# Patient Record
Sex: FEMALE | Race: WHITE | Employment: UNEMPLOYED | URBAN - METROPOLITAN AREA
[De-identification: names, ages, dates, MRNs, and addresses within clinical notes are randomized per-mention and may not be internally consistent; named-entity substitution may affect disease eponyms.]

---

## 2021-02-04 ENCOUNTER — HOSPITAL ENCOUNTER (EMERGENCY)
Age: 56
Discharge: HOME | End: 2021-02-06
Attending: EMERGENCY MEDICINE

## 2021-02-04 DIAGNOSIS — F10.920 ALCOHOLIC INTOXICATION WITHOUT COMPLICATION (HCC): Primary | ICD-10-CM

## 2021-02-04 DIAGNOSIS — F32.A DEPRESSION WITH SUICIDAL IDEATION: ICD-10-CM

## 2021-02-04 DIAGNOSIS — R45.851 DEPRESSION WITH SUICIDAL IDEATION: ICD-10-CM

## 2021-02-04 LAB
ANION GAP SERPL CALC-SCNC: 9 MMOL/L (ref 7–16)
APAP SERPL-MCNC: <10 UG/ML (ref 10–30)
BACTERIA URNS QL MICRO: 0 /HPF
BASOPHILS # BLD: 0 K/UL (ref 0–0.2)
BASOPHILS NFR BLD: 0 % (ref 0–2)
BUN SERPL-MCNC: 7 MG/DL (ref 6–23)
CALCIUM SERPL-MCNC: 9.2 MG/DL (ref 8.3–10.4)
CASTS URNS QL MICRO: 0 /LPF
CHLORIDE SERPL-SCNC: 108 MMOL/L (ref 98–107)
CO2 SERPL-SCNC: 26 MMOL/L (ref 21–32)
CREAT SERPL-MCNC: 0.48 MG/DL (ref 0.6–1)
DIFFERENTIAL METHOD BLD: ABNORMAL
EOSINOPHIL # BLD: 0.1 K/UL (ref 0–0.8)
EOSINOPHIL NFR BLD: 1 % (ref 0.5–7.8)
EPI CELLS #/AREA URNS HPF: NORMAL /HPF
ERYTHROCYTE [DISTWIDTH] IN BLOOD BY AUTOMATED COUNT: 13.2 % (ref 11.9–14.6)
ETHANOL SERPL-MCNC: 301 MG/DL
GLUCOSE SERPL-MCNC: 94 MG/DL (ref 65–100)
HCT VFR BLD AUTO: 49.3 % (ref 35.8–46.3)
HGB BLD-MCNC: 16.9 G/DL (ref 11.7–15.4)
IMM GRANULOCYTES # BLD AUTO: 0 K/UL (ref 0–0.5)
IMM GRANULOCYTES NFR BLD AUTO: 0 % (ref 0–5)
LYMPHOCYTES # BLD: 5.4 K/UL (ref 0.5–4.6)
LYMPHOCYTES NFR BLD: 59 % (ref 13–44)
MCH RBC QN AUTO: 31.8 PG (ref 26.1–32.9)
MCHC RBC AUTO-ENTMCNC: 34.3 G/DL (ref 31.4–35)
MCV RBC AUTO: 92.8 FL (ref 79.6–97.8)
MONOCYTES # BLD: 0.3 K/UL (ref 0.1–1.3)
MONOCYTES NFR BLD: 3 % (ref 4–12)
NEUTS SEG # BLD: 3.3 K/UL (ref 1.7–8.2)
NEUTS SEG NFR BLD: 36 % (ref 43–78)
NRBC # BLD: 0 K/UL (ref 0–0.2)
PLATELET # BLD AUTO: 191 K/UL (ref 150–450)
PMV BLD AUTO: 10.7 FL (ref 9.4–12.3)
POTASSIUM SERPL-SCNC: 3.8 MMOL/L (ref 3.5–5.1)
RBC # BLD AUTO: 5.31 M/UL (ref 4.05–5.2)
RBC #/AREA URNS HPF: 0 /HPF
SALICYLATES SERPL-MCNC: 6.5 MG/DL (ref 2.8–20)
SODIUM SERPL-SCNC: 143 MMOL/L (ref 136–145)
WBC # BLD AUTO: 9 K/UL (ref 4.3–11.1)
WBC URNS QL MICRO: NORMAL /HPF

## 2021-02-04 PROCEDURE — 80307 DRUG TEST PRSMV CHEM ANLYZR: CPT

## 2021-02-04 PROCEDURE — 80143 DRUG ASSAY ACETAMINOPHEN: CPT

## 2021-02-04 PROCEDURE — 74011250637 HC RX REV CODE- 250/637: Performed by: EMERGENCY MEDICINE

## 2021-02-04 PROCEDURE — 80179 DRUG ASSAY SALICYLATE: CPT

## 2021-02-04 PROCEDURE — 82077 ASSAY SPEC XCP UR&BREATH IA: CPT

## 2021-02-04 PROCEDURE — 99285 EMERGENCY DEPT VISIT HI MDM: CPT

## 2021-02-04 PROCEDURE — 85025 COMPLETE CBC W/AUTO DIFF WBC: CPT

## 2021-02-04 PROCEDURE — 81015 MICROSCOPIC EXAM OF URINE: CPT

## 2021-02-04 PROCEDURE — 80048 BASIC METABOLIC PNL TOTAL CA: CPT

## 2021-02-04 RX ORDER — IBUPROFEN 200 MG
1 TABLET ORAL EVERY 24 HOURS
Status: DISCONTINUED | OUTPATIENT
Start: 2021-02-04 | End: 2021-02-06 | Stop reason: HOSPADM

## 2021-02-05 LAB
AMPHET UR QL SCN: NEGATIVE
BARBITURATES UR QL SCN: NEGATIVE
BENZODIAZ UR QL: NEGATIVE
CANNABINOIDS UR QL SCN: NEGATIVE
COCAINE UR QL SCN: NEGATIVE
METHADONE UR QL: NEGATIVE
OPIATES UR QL: NEGATIVE
PCP UR QL: NEGATIVE
SERVICE CMNT-IMP: NORMAL
WET PREP GENITAL: NORMAL
WET PREP GENITAL: NORMAL

## 2021-02-05 PROCEDURE — 74011250637 HC RX REV CODE- 250/637: Performed by: EMERGENCY MEDICINE

## 2021-02-05 PROCEDURE — 87210 SMEAR WET MOUNT SALINE/INK: CPT

## 2021-02-05 RX ORDER — LANOLIN ALCOHOL/MO/W.PET/CERES
100 CREAM (GRAM) TOPICAL DAILY
Status: DISCONTINUED | OUTPATIENT
Start: 2021-02-05 | End: 2021-02-06 | Stop reason: HOSPADM

## 2021-02-05 RX ORDER — QUETIAPINE FUMARATE 100 MG/1
300 TABLET, FILM COATED ORAL ONCE
Status: COMPLETED | OUTPATIENT
Start: 2021-02-05 | End: 2021-02-05

## 2021-02-05 RX ORDER — CITALOPRAM 20 MG/1
20 TABLET, FILM COATED ORAL DAILY
Status: DISCONTINUED | OUTPATIENT
Start: 2021-02-05 | End: 2021-02-06 | Stop reason: HOSPADM

## 2021-02-05 RX ORDER — FOLIC ACID 1 MG/1
1 TABLET ORAL DAILY
Status: DISCONTINUED | OUTPATIENT
Start: 2021-02-05 | End: 2021-02-06 | Stop reason: HOSPADM

## 2021-02-05 RX ORDER — LORAZEPAM 1 MG/1
2 TABLET ORAL
Status: DISCONTINUED | OUTPATIENT
Start: 2021-02-05 | End: 2021-02-06 | Stop reason: HOSPADM

## 2021-02-05 RX ADMIN — CITALOPRAM HYDROBROMIDE 20 MG: 20 TABLET ORAL at 08:54

## 2021-02-05 RX ADMIN — QUETIAPINE FUMARATE 300 MG: 100 TABLET ORAL at 20:33

## 2021-02-05 RX ADMIN — FOLIC ACID 1 MG: 1 TABLET ORAL at 08:53

## 2021-02-05 RX ADMIN — LORAZEPAM 2 MG: 1 TABLET ORAL at 08:54

## 2021-02-05 RX ADMIN — Medication 100 MG: at 08:53

## 2021-02-05 NOTE — ED NOTES
Patient  Yes - Name: lynette   Patient  Oriented YES   High risk patients are in line of sight at all times Yes   Excess equipment/medical supplies not necessary for the care of the patient removed Yes   All sharp or dangerous objects are removed from room: including but not limited to belts, pens & pencils, needles, medications, cosmetics, lighters, matches, nail files, watches, necklaces, glass objects, razors, razor blades, knives, aerosol sprays, drawstring pants, shoes, cords (telephone, call bells, etc.) cleaning wipes or other cleaning items, aluminum cans, not permanently attached wall décor Yes   Telephone/cell phone removed as well as TV remote (batteries can be swallowed) Yes   Patient belongings removed and labeled at nurses station Yes   Excess linen is removed from room Yes   All plastic bags are removed from the room and replaced with paper trash bags Yes   Patient is in gown and using hospital socks with rubber soles Yes   No metal, hard eating utensils or hard plates are on meal tray Yes   Remove all cleaning agents used by [employer] Yes   Ensure bathroom door key is easily accessible Yes   If Crucifix is hanging on a nail, remove Crucifix as well as the nail Yes       *If any question above is answered \"No,\" documentation is required.

## 2021-02-05 NOTE — ED NOTES
Pt updated on plan of care. VS obtained. Pt stating she would like mesh panties, pad and is having discharge. Verbal order to obtain wet prep and patient has self swabbed. Pt aware of what time daily medications are due and that breakfast has been ordered.

## 2021-02-05 NOTE — ED NOTES
Pt changed into paper scrubs and gripper socks. Pt's belongings placed into belongings bags with pt ID labels on them. Pt's belongings secured at nurses station. Security called to account for contents of pt's purse.

## 2021-02-05 NOTE — PROGRESS NOTES
Care Management Interventions  Transition of Care Consult (CM Consult): Discharge Planning  Discharge Durable Medical Equipment: No  Physical Therapy Consult: No  Occupational Therapy Consult: No  Current Support Network: Shelter  Confirm Follow Up Transport: Cab  Discharge Location  Discharge Placement: Unable to determine at this time      Patient referred to [facility]. Placed on waiting list. Adeola Cartagena 121 screen sent to [facility]. CM met with pt to discuss DC needs. Patient states she is homeless and is unable to return to [facility] for 60 days. Patient is requesting assistance with sobriety. CM spoke with [facility] [phone]. [facility] liaison will see patient today to assist with sober living options. Patient provided with clothing and backpack upon dc. Patient remains under IVC. CM to continue to follow.

## 2021-02-05 NOTE — ED NOTES
Resting with eyes closed. Lights off. Sitter at door. Tele-psych assessment completed. Placed on involuntary commitment papers.  Pt aware of plan

## 2021-02-05 NOTE — ED PROVIDER NOTES
64-year-old  female with a history of depression presents to the emergency department complaining of suicidal ideation, stating she might consider running out in front of a car or just cut herself. Patient complains of longstanding history of depression, just much worse lately stating she cries frequently. She does admit to drinking alcohol this evening and requests a nicotine patch. She also requests something for sleep as she has a lot of trouble with sleep. Patient is currently homeless, has no family or friends. The history is provided by the patient. Mental Health Problem   This is a chronic problem. The current episode started more than 1 week ago. The problem has been gradually worsening. Associated symptoms include self-injury. Pertinent negatives include no confusion, no somnolence, no seizures, no unresponsiveness, no weakness, no agitation, no delusions, no hallucinations, no violence, no tingling and no numbness. Mental status baseline is normal.  Risk factors include alcohol intake. Her past medical history is significant for depression. Her past medical history does not include diabetes, seizures, liver disease, CVA, TIA, AIDS, hypertension, COPD, dementia, psychotropic medication treatment, head trauma or heart disease. No past medical history on file. No past surgical history on file. No family history on file.     Social History     Socioeconomic History    Marital status: SINGLE     Spouse name: Not on file    Number of children: Not on file    Years of education: Not on file    Highest education level: Not on file   Occupational History    Not on file   Social Needs    Financial resource strain: Not on file    Food insecurity     Worry: Not on file     Inability: Not on file    Transportation needs     Medical: Not on file     Non-medical: Not on file   Tobacco Use    Smoking status: Not on file   Substance and Sexual Activity    Alcohol use: Not on file    Drug use: Not on file    Sexual activity: Not on file   Lifestyle    Physical activity     Days per week: Not on file     Minutes per session: Not on file    Stress: Not on file   Relationships    Social connections     Talks on phone: Not on file     Gets together: Not on file     Attends Roman Catholic service: Not on file     Active member of club or organization: Not on file     Attends meetings of clubs or organizations: Not on file     Relationship status: Not on file    Intimate partner violence     Fear of current or ex partner: Not on file     Emotionally abused: Not on file     Physically abused: Not on file     Forced sexual activity: Not on file   Other Topics Concern    Not on file   Social History Narrative    Not on file         ALLERGIES: Patient has no known allergies. Review of Systems   Constitutional: Negative for chills and fever. Neurological: Negative for tingling, seizures, weakness and numbness. Psychiatric/Behavioral: Positive for dysphoric mood, self-injury and suicidal ideas. Negative for agitation, confusion and hallucinations. All other systems reviewed and are negative. Vitals:    02/04/21 2112   BP: 121/84   Pulse: (!) 103   Resp: 20   Temp: 97.9 °F (36.6 °C)   SpO2: 98%   Weight: 59 kg (130 lb)   Height: 5' 5\" (1.651 m)            Physical Exam  Vitals signs and nursing note reviewed. Constitutional:       General: She is not in acute distress. Appearance: She is well-developed. HENT:      Head: Normocephalic and atraumatic. Right Ear: External ear normal.      Left Ear: External ear normal.   Eyes:      Conjunctiva/sclera: Conjunctivae normal.      Pupils: Pupils are equal, round, and reactive to light. Neck:      Musculoskeletal: Normal range of motion and neck supple. Cardiovascular:      Rate and Rhythm: Normal rate and regular rhythm. Heart sounds: Normal heart sounds.    Pulmonary:      Effort: Pulmonary effort is normal.      Breath sounds: Normal breath sounds. Abdominal:      General: Bowel sounds are normal.      Palpations: Abdomen is soft. Tenderness: There is no abdominal tenderness. Musculoskeletal: Normal range of motion. Skin:     General: Skin is warm and dry. Capillary Refill: Capillary refill takes less than 2 seconds. Neurological:      Mental Status: She is alert and oriented to person, place, and time. Psychiatric:         Mood and Affect: Mood is depressed. Affect is flat. Speech: Speech is delayed. Behavior: Behavior is slowed. Thought Content: Thought content is not paranoid or delusional. Thought content includes suicidal ideation. Thought content does not include homicidal ideation. Thought content includes suicidal plan. Thought content does not include homicidal plan. Cognition and Memory: Cognition and memory normal.          MDM  Number of Diagnoses or Management Options  Alcoholic intoxication without complication ([facility] [address]): new and requires workup  Depression with suicidal ideation: new and requires workup  Diagnosis management comments: 5:26 AM  Patient now sober. Will have her evaluated by a psychiatrist    5:52 AM  Patient seen by psychiatry who is recommended involuntary commitment. Amount and/or Complexity of Data Reviewed  Clinical lab tests: ordered and reviewed  Review and summarize past medical records: yes (Patient presented in a similar fashion to [facility] a little over a month ago.)    Risk of Complications, Morbidity, and/or Mortality  Presenting problems: high  Diagnostic procedures: minimal  Management options: moderate    Patient Progress  Patient progress: stable         Procedures    The patient was observed in the ED. patient will be reevaluated when she is sober. Until then she was placed on suicide watch. Case discussed with Dr. Jazmin Rao who will take over the patient's care at shift change.     Results Reviewed:      Recent Results (from the past 24 hour(s))   CBC WITH AUTOMATED DIFF    Collection Time: 02/04/21  9:52 PM   Result Value Ref Range    WBC 9.0 4.3 - 11.1 K/uL    RBC 5.31 (H) 4.05 - 5.2 M/uL    HGB 16.9 (H) 11.7 - 15.4 g/dL    HCT 49.3 (H) 35.8 - 46.3 %    MCV 92.8 79.6 - 97.8 FL    MCH 31.8 26.1 - 32.9 PG    MCHC 34.3 31.4 - 35.0 g/dL    RDW 13.2 11.9 - 14.6 %    PLATELET 794 107 - 407 K/uL    MPV 10.7 9.4 - 12.3 FL    ABSOLUTE NRBC 0.00 0.0 - 0.2 K/uL    DF AUTOMATED      NEUTROPHILS 36 (L) 43 - 78 %    LYMPHOCYTES 59 (H) 13 - 44 %    MONOCYTES 3 (L) 4.0 - 12.0 %    EOSINOPHILS 1 0.5 - 7.8 %    BASOPHILS 0 0.0 - 2.0 %    IMMATURE GRANULOCYTES 0 0.0 - 5.0 %    ABS. NEUTROPHILS 3.3 1.7 - 8.2 K/UL    ABS. LYMPHOCYTES 5.4 (H) 0.5 - 4.6 K/UL    ABS. MONOCYTES 0.3 0.1 - 1.3 K/UL    ABS. EOSINOPHILS 0.1 0.0 - 0.8 K/UL    ABS. BASOPHILS 0.0 0.0 - 0.2 K/UL    ABS. IMM.  GRANS. 0.0 0.0 - 0.5 K/UL   METABOLIC PANEL, BASIC    Collection Time: 02/04/21  9:52 PM   Result Value Ref Range    Sodium 143 136 - 145 mmol/L    Potassium 3.8 3.5 - 5.1 mmol/L    Chloride 108 (H) 98 - 107 mmol/L    CO2 26 21 - 32 mmol/L    Anion gap 9 7 - 16 mmol/L    Glucose 94 65 - 100 mg/dL    BUN 7 6 - 23 MG/DL    Creatinine 0.48 (L) 0.6 - 1.0 MG/DL    GFR est AA >60 >60 ml/min/1.73m2    GFR est non-AA >60 >60 ml/min/1.73m2    Calcium 9.2 8.3 - 10.4 MG/DL   ETHYL ALCOHOL    Collection Time: 02/04/21  9:52 PM   Result Value Ref Range    ALCOHOL(ETHYL),SERUM 301 MG/DL   ACETAMINOPHEN    Collection Time: 02/04/21  9:52 PM   Result Value Ref Range    Acetaminophen level <10 (L) 10.0 - 77.7 ug/mL   SALICYLATE    Collection Time: 02/04/21  9:52 PM   Result Value Ref Range    Salicylate level 6.5 2.8 - 20.0 MG/DL
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
## 2021-02-05 NOTE — ED NOTES
Patient  Yes - Name: Yusuf Mendez   Patient  Oriented YES   High risk patients are in line of sight at all times Yes   Excess equipment/medical supplies not necessary for the care of the patient removed Yes   All sharp or dangerous objects are removed from room: including but not limited to belts, pens & pencils, needles, medications, cosmetics, lighters, matches, nail files, watches, necklaces, glass objects, razors, razor blades, knives, aerosol sprays, drawstring pants, shoes, cords (telephone, call bells, etc.) cleaning wipes or other cleaning items, aluminum cans, not permanently attached wall décor Yes   Telephone/cell phone removed as well as TV remote (batteries can be swallowed) Yes   Patient belongings removed and labeled at nurses station Yes   Excess linen is removed from room Yes   All plastic bags are removed from the room and replaced with paper trash bags Yes   Patient is in gown and using hospital socks with rubber soles Yes   No metal, hard eating utensils or hard plates are on meal tray Yes   Remove all cleaning agents used by [employer] Yes   Ensure bathroom door key is easily accessible Yes   If Crucifix is hanging on a nail, remove Crucifix as well as the nail Yes       *If any question above is answered \"No,\" documentation is required.

## 2021-02-06 VITALS
DIASTOLIC BLOOD PRESSURE: 65 MMHG | WEIGHT: 130 LBS | BODY MASS INDEX: 21.66 KG/M2 | OXYGEN SATURATION: 96 % | TEMPERATURE: 97.8 F | HEIGHT: 65 IN | RESPIRATION RATE: 16 BRPM | HEART RATE: 110 BPM | SYSTOLIC BLOOD PRESSURE: 93 MMHG

## 2021-02-06 PROCEDURE — 74011250637 HC RX REV CODE- 250/637: Performed by: EMERGENCY MEDICINE

## 2021-02-06 RX ADMIN — Medication 100 MG: at 08:33

## 2021-02-06 RX ADMIN — FOLIC ACID 1 MG: 1 TABLET ORAL at 08:33

## 2021-02-06 RX ADMIN — CITALOPRAM HYDROBROMIDE 20 MG: 20 TABLET ORAL at 08:33

## 2021-02-06 NOTE — ED NOTES
Attempted to locate phone upon discharging patient. Pt reports she had her phone yesterday with her white  and that it was given back to a nurse. White  located in bags that are on back bassett by [employer]. All drawers in nurses station searched, all shelves and searched underneath items on the back bassett. Spoke to supervisor Beny Beard, requested that I follow up with Director, Shu Cadena. Spoke with Director Radha who states we will be responsible to pay for phone if unable to still locate. Security searched the entire office for phone and states does not have any unlabeled phones or secure packages in their area. Pt given number for ER and patient relations and let them know to call these numbers started Monday at [address]. I have patient's current cellphone number to continue to call if need be. Described as a black android phone with no case. Pt and FAVOR updated with all of this, pt discharged to sober living. House supervisor Neli Moses also aware of lost phone.

## 2021-02-06 NOTE — ED NOTES
Patient  Yes - Name: Radha Casas   Patient  Oriented YES   High risk patients are in line of sight at all times Yes   Excess equipment/medical supplies not necessary for the care of the patient removed Yes   All sharp or dangerous objects are removed from room: including but not limited to belts, pens & pencils, needles, medications, cosmetics, lighters, matches, nail files, watches, necklaces, glass objects, razors, razor blades, knives, aerosol sprays, drawstring pants, shoes, cords (telephone, call bells, etc.) cleaning wipes or other cleaning items, aluminum cans, not permanently attached wall décor Yes   Telephone/cell phone removed as well as TV remote (batteries can be swallowed) Yes   Patient belongings removed and labeled at nurses station Yes   Excess linen is removed from room Yes   All plastic bags are removed from the room and replaced with paper trash bags Yes   Patient is in gown and using hospital socks with rubber soles Yes   No metal, hard eating utensils or hard plates are on meal tray Yes   Remove all cleaning agents used by [employer] Yes   Ensure bathroom door key is easily accessible Yes   If Crucifix is hanging on a nail, remove Crucifix as well as the nail Yes       *If any question above is answered \"No,\" documentation is required.

## 2021-02-06 NOTE — ED NOTES
Patient says she normally takes 300 mg of Seroquel at night. She requested a dose. I will give her 300 mg.

## 2021-02-06 NOTE — ED NOTES
Patient  Yes - Name: Cas   Patient  Oriented YES   High risk patients are in line of sight at all times Yes   Excess equipment/medical supplies not necessary for the care of the patient removed Yes   All sharp or dangerous objects are removed from room: including but not limited to belts, pens & pencils, needles, medications, cosmetics, lighters, matches, nail files, watches, necklaces, glass objects, razors, razor blades, knives, aerosol sprays, drawstring pants, shoes, cords (telephone, call bells, etc.) cleaning wipes or other cleaning items, aluminum cans, not permanently attached wall décor Yes   Telephone/cell phone removed as well as TV remote (batteries can be swallowed) Yes   Patient belongings removed and labeled at nurses station Yes   Excess linen is removed from room Yes   All plastic bags are removed from the room and replaced with paper trash bags Yes   Patient is in gown and using hospital socks with rubber soles Yes   No metal, hard eating utensils or hard plates are on meal tray Yes   Remove all cleaning agents used by [employer] Yes   Ensure bathroom door key is easily accessible Yes   If Crucifix is hanging on a nail, remove Crucifix as well as the nail Yes       *If any question above is answered \"No,\" documentation is required.

## 2021-02-06 NOTE — ED NOTES
I have reviewed discharge instructions with the patient. The patient verbalized understanding. Patient left ED via Discharge Method: ambulatory to sober living with favor representative  Opportunity for questions and clarification provided. Patient given 0 scripts. No e-sign        To continue your aftercare when you leave the hospital, you may receive an automated call from our care team to check in on how you are doing. This is a free service and part of our promise to provide the best care and service to meet your aftercare needs.  If you have questions, or wish to unsubscribe from this service please call [phone]. Thank you for Choosing our [facility].

## 2021-02-06 NOTE — ED NOTES
Patient wants to take a shower first and getting security and sitter to go with patient off the floor to take a shower and come back to room.

## 2021-02-06 NOTE — ED NOTES
11:07 AM  Spoke with patient. She denies any homicidal suicidal ideations. States her main problem is alcohol she does get depressed when she drinks. In reviewing records, patient was interviewed by psychiatry while she apparently was intoxicated. No alcohol to drink in about the last 36 hours. Patient without any obvious evidence for withdrawal symptoms. Crystal Curry has visited with and patient has appointment at [facility] in a few hours. Patient without any active hallucinations or delusions. Once again denies any homicidal suicidal ideations. I believe commitment papers can be rescinded so patient can receive outpatient treatment for her ongoing alcohol issues in a supervised, outpatient setting. D certification papers completed.

## 2021-02-06 NOTE — DISCHARGE INSTRUCTIONS
Use your resources at [facility] to discuss depression symptoms as well as her alcohol use. Return if you start to feel suicidal again. Hopefully, [facility] will be provide you some outpatient resources when you leave there regarding ongoing counseling as far as depression.

## 2021-05-27 PROCEDURE — 81003 URINALYSIS AUTO W/O SCOPE: CPT

## 2021-05-27 PROCEDURE — 99285 EMERGENCY DEPT VISIT HI MDM: CPT

## 2021-05-28 ENCOUNTER — HOSPITAL ENCOUNTER (EMERGENCY)
Age: 56
Discharge: PSYCHIATRIC OR SUBSTANCE USE FACILITY | End: 2021-05-28

## 2021-05-28 VITALS
OXYGEN SATURATION: 98 % | TEMPERATURE: 98.2 F | HEART RATE: 109 BPM | RESPIRATION RATE: 18 BRPM | WEIGHT: 160 LBS | SYSTOLIC BLOOD PRESSURE: 155 MMHG | DIASTOLIC BLOOD PRESSURE: 98 MMHG | HEIGHT: 65 IN | BODY MASS INDEX: 26.66 KG/M2

## 2021-05-28 DIAGNOSIS — F19.10 POLYSUBSTANCE ABUSE (HCC): ICD-10-CM

## 2021-05-28 DIAGNOSIS — Z86.59 HX OF BIPOLAR DISORDER: ICD-10-CM

## 2021-05-28 DIAGNOSIS — F10.929 ALCOHOLIC INTOXICATION WITH COMPLICATION (HCC): ICD-10-CM

## 2021-05-28 DIAGNOSIS — F32.A DEPRESSION, UNSPECIFIED DEPRESSION TYPE: ICD-10-CM

## 2021-05-28 DIAGNOSIS — R45.4 IRRITABILITY AND ANGER: Primary | ICD-10-CM

## 2021-05-28 DIAGNOSIS — F10.10 ALCOHOL ABUSE: ICD-10-CM

## 2021-05-28 LAB
ALBUMIN SERPL-MCNC: 3.7 G/DL (ref 3.5–5)
ALBUMIN/GLOB SERPL: 1.1 {RATIO} (ref 1.2–3.5)
ALP SERPL-CCNC: 120 U/L (ref 50–136)
ALT SERPL-CCNC: 83 U/L (ref 12–65)
AMPHET UR QL SCN: NEGATIVE
ANION GAP SERPL CALC-SCNC: 10 MMOL/L (ref 7–16)
APAP SERPL-MCNC: <2 UG/ML (ref 10–30)
AST SERPL-CCNC: 62 U/L (ref 15–37)
ATRIAL RATE: 107 BPM
BARBITURATES UR QL SCN: NEGATIVE
BASOPHILS # BLD: 0 K/UL (ref 0–0.2)
BASOPHILS NFR BLD: 0 % (ref 0–2)
BENZODIAZ UR QL: NEGATIVE
BILIRUB SERPL-MCNC: 0.3 MG/DL (ref 0.2–1.1)
BUN SERPL-MCNC: 8 MG/DL (ref 6–23)
CALCIUM SERPL-MCNC: 8.7 MG/DL (ref 8.3–10.4)
CALCULATED P AXIS, ECG09: 77 DEGREES
CALCULATED R AXIS, ECG10: 67 DEGREES
CALCULATED T AXIS, ECG11: 69 DEGREES
CANNABINOIDS UR QL SCN: POSITIVE
CHLORIDE SERPL-SCNC: 107 MMOL/L (ref 98–107)
CO2 SERPL-SCNC: 26 MMOL/L (ref 21–32)
COCAINE UR QL SCN: POSITIVE
CREAT SERPL-MCNC: 0.51 MG/DL (ref 0.6–1)
DIAGNOSIS, 93000: NORMAL
DIFFERENTIAL METHOD BLD: ABNORMAL
EOSINOPHIL # BLD: 0.1 K/UL (ref 0–0.8)
EOSINOPHIL NFR BLD: 1 % (ref 0.5–7.8)
ERYTHROCYTE [DISTWIDTH] IN BLOOD BY AUTOMATED COUNT: 13.9 % (ref 11.9–14.6)
ETHANOL SERPL-MCNC: 252 MG/DL
GLOBULIN SER CALC-MCNC: 3.5 G/DL (ref 2.3–3.5)
GLUCOSE SERPL-MCNC: 121 MG/DL (ref 65–100)
HCG UR QL: NEGATIVE
HCT VFR BLD AUTO: 42.5 % (ref 35.8–46.3)
HGB BLD-MCNC: 14.4 G/DL (ref 11.7–15.4)
IMM GRANULOCYTES # BLD AUTO: 0 K/UL (ref 0–0.5)
IMM GRANULOCYTES NFR BLD AUTO: 0 % (ref 0–5)
LYMPHOCYTES # BLD: 3.6 K/UL (ref 0.5–4.6)
LYMPHOCYTES NFR BLD: 59 % (ref 13–44)
MAGNESIUM SERPL-MCNC: 2 MG/DL (ref 1.8–2.4)
MCH RBC QN AUTO: 31.4 PG (ref 26.1–32.9)
MCHC RBC AUTO-ENTMCNC: 33.9 G/DL (ref 31.4–35)
MCV RBC AUTO: 92.6 FL (ref 79.6–97.8)
METHADONE UR QL: NEGATIVE
MONOCYTES # BLD: 0.3 K/UL (ref 0.1–1.3)
MONOCYTES NFR BLD: 5 % (ref 4–12)
NEUTS SEG # BLD: 2.1 K/UL (ref 1.7–8.2)
NEUTS SEG NFR BLD: 34 % (ref 43–78)
NRBC # BLD: 0 K/UL (ref 0–0.2)
OPIATES UR QL: NEGATIVE
P-R INTERVAL, ECG05: 144 MS
PCP UR QL: NEGATIVE
PLATELET # BLD AUTO: 189 K/UL (ref 150–450)
PMV BLD AUTO: 10.3 FL (ref 9.4–12.3)
POTASSIUM SERPL-SCNC: 3.6 MMOL/L (ref 3.5–5.1)
PROT SERPL-MCNC: 7.2 G/DL (ref 6.3–8.2)
Q-T INTERVAL, ECG07: 350 MS
QRS DURATION, ECG06: 88 MS
QTC CALCULATION (BEZET), ECG08: 467 MS
RBC # BLD AUTO: 4.59 M/UL (ref 4.05–5.2)
SALICYLATES SERPL-MCNC: 4.5 MG/DL (ref 2.8–20)
SODIUM SERPL-SCNC: 143 MMOL/L (ref 136–145)
VENTRICULAR RATE, ECG03: 107 BPM
WBC # BLD AUTO: 6.1 K/UL (ref 4.3–11.1)

## 2021-05-28 PROCEDURE — 85025 COMPLETE CBC W/AUTO DIFF WBC: CPT

## 2021-05-28 PROCEDURE — 80053 COMPREHEN METABOLIC PANEL: CPT

## 2021-05-28 PROCEDURE — 96372 THER/PROPH/DIAG INJ SC/IM: CPT

## 2021-05-28 PROCEDURE — 80143 DRUG ASSAY ACETAMINOPHEN: CPT

## 2021-05-28 PROCEDURE — 74011250637 HC RX REV CODE- 250/637: Performed by: EMERGENCY MEDICINE

## 2021-05-28 PROCEDURE — 93005 ELECTROCARDIOGRAM TRACING: CPT

## 2021-05-28 PROCEDURE — 74011250636 HC RX REV CODE- 250/636

## 2021-05-28 PROCEDURE — 80179 DRUG ASSAY SALICYLATE: CPT

## 2021-05-28 PROCEDURE — 80307 DRUG TEST PRSMV CHEM ANLYZR: CPT

## 2021-05-28 PROCEDURE — 83735 ASSAY OF MAGNESIUM: CPT

## 2021-05-28 PROCEDURE — 82077 ASSAY SPEC XCP UR&BREATH IA: CPT

## 2021-05-28 PROCEDURE — 81025 URINE PREGNANCY TEST: CPT

## 2021-05-28 PROCEDURE — 90792 PSYCH DIAG EVAL W/MED SRVCS: CPT | Performed by: NURSE PRACTITIONER

## 2021-05-28 RX ORDER — FOLIC ACID 1 MG/1
1 TABLET ORAL DAILY
Status: DISCONTINUED | OUTPATIENT
Start: 2021-05-28 | End: 2021-05-28 | Stop reason: HOSPADM

## 2021-05-28 RX ORDER — LANOLIN ALCOHOL/MO/W.PET/CERES
100 CREAM (GRAM) TOPICAL DAILY
Status: DISCONTINUED | OUTPATIENT
Start: 2021-05-28 | End: 2021-05-28 | Stop reason: HOSPADM

## 2021-05-28 RX ORDER — ESCITALOPRAM OXALATE 10 MG/1
10 TABLET ORAL DAILY
Qty: 30 TABLET | Refills: 0 | Status: SHIPPED | OUTPATIENT
Start: 2021-05-28 | End: 2021-06-27

## 2021-05-28 RX ORDER — QUETIAPINE FUMARATE 100 MG/1
100 TABLET, FILM COATED ORAL
Status: DISCONTINUED | OUTPATIENT
Start: 2021-05-28 | End: 2021-05-28 | Stop reason: HOSPADM

## 2021-05-28 RX ORDER — LORAZEPAM 2 MG/ML
1 INJECTION INTRAMUSCULAR
Status: DISCONTINUED | OUTPATIENT
Start: 2021-05-28 | End: 2021-05-28 | Stop reason: HOSPADM

## 2021-05-28 RX ORDER — ESCITALOPRAM OXALATE 10 MG/1
10 TABLET ORAL DAILY
Status: DISCONTINUED | OUTPATIENT
Start: 2021-05-28 | End: 2021-05-28 | Stop reason: HOSPADM

## 2021-05-28 RX ORDER — DIPHENHYDRAMINE HYDROCHLORIDE 50 MG/ML
25 INJECTION, SOLUTION INTRAMUSCULAR; INTRAVENOUS
Status: COMPLETED | OUTPATIENT
Start: 2021-05-28 | End: 2021-05-28

## 2021-05-28 RX ORDER — QUETIAPINE FUMARATE 100 MG/1
100 TABLET, FILM COATED ORAL
Qty: 30 TABLET | Refills: 0 | Status: SHIPPED | OUTPATIENT
Start: 2021-05-28 | End: 2021-06-27

## 2021-05-28 RX ORDER — ESCITALOPRAM OXALATE 10 MG/1
10 TABLET ORAL DAILY
Status: DISCONTINUED | OUTPATIENT
Start: 2021-05-28 | End: 2021-05-28

## 2021-05-28 RX ORDER — LORAZEPAM 1 MG/1
1 TABLET ORAL
Status: DISCONTINUED | OUTPATIENT
Start: 2021-05-28 | End: 2021-05-28 | Stop reason: HOSPADM

## 2021-05-28 RX ORDER — HALOPERIDOL 5 MG/ML
10 INJECTION INTRAMUSCULAR
Status: COMPLETED | OUTPATIENT
Start: 2021-05-28 | End: 2021-05-28

## 2021-05-28 RX ADMIN — FOLIC ACID 1 MG: 1 TABLET ORAL at 14:58

## 2021-05-28 RX ADMIN — Medication 100 MG: at 14:57

## 2021-05-28 RX ADMIN — ESCITALOPRAM OXALATE 10 MG: 10 TABLET ORAL at 14:57

## 2021-05-28 RX ADMIN — HALOPERIDOL LACTATE 10 MG: 5 INJECTION, SOLUTION INTRAMUSCULAR at 00:37

## 2021-05-28 RX ADMIN — LORAZEPAM 1 MG: 1 TABLET ORAL at 09:10

## 2021-05-28 RX ADMIN — DIPHENHYDRAMINE HYDROCHLORIDE 25 MG: 50 INJECTION, SOLUTION INTRAMUSCULAR; INTRAVENOUS at 00:37

## 2021-05-28 NOTE — ED NOTES
Pt sitting up in bed. States she is feeling \"rough\" and feels like she is getting increasingly shakie. Will talk to MD to ask for something to help with with this.

## 2021-05-28 NOTE — PROGRESS NOTES
1420 pm-- confirmed appointment time for admission at the [facility] for 9 pm, round trip transport placed. Referral made to [facility] for follow up.    1050 am-- labs and MD notes faxed to [facility]. Pt seen by [facility] staff Alin and states they have contacted the [facility] and they can accept pt tonight at 9 pm, MD updated and pending re evaluation. CM will remain available.

## 2021-05-28 NOTE — PROGRESS NOTES
[facility]                                                                                                 PATIENT INFORMATION   Patient Name: Armaan Irwin: [de-identified] Patient MRN: [id number]   Address: [address] [place] [address] Patient CSN: [id number]      Lutheran: NO Caodaism   Sex: Female Marital Status: SINGLE   : 1965 Age:   48 yrs   Home Phone: [phone]  Mobile Phone:   [phone]        Race: WHITE Employer:   Not Employed   Language: ENGLISH Admitted/Arrived From:   Other [[id number]]   ADMISSION INFORMATION   Admit Date: 2021 Admit Time: 12:13 AM   Patient Class: Emergency Service: Emergency   Admit Source: Non-health care facility* Admit Type: EMERGENCY   Admitting Provider:   Attending Provider: Geo Figueredo   Unit: [facility] Room/Bed: ER17/17    Admission Diagnosis:  and codes:      Emergency Complaint: SI, HI                 Discharge Date:   Discharge Time:     GUARANTOR INFORMATION   Name:  Danelle Price Address: [address] [place] [address]  Rel:  Self   Phone: [phone] David Griffin[address] : 1965   EMERGENCY CONTACTS   Name: iLban Thang:  Mobile:    [phone] Rel: Other Non-Relative   COVERAGE INFORMATION   Primary Insurance:   N/A Subscriber:     Plan Name:   Pt Rel to Subscriber:     Claim Address: NA Sex:        Policy #:  N/A    Group #: N/A Group Name:       Auth #: N/A Ins Phone:         Secondary Insurance:   Subscriber:     Plan Name:   Pt Rel to Subscriber:     Claim Address: NA Sex:       Policy #: N/A    Group #: N/A Group Name: N/A   Auth #: N/A Ins Phone:         Accident Date:    Accident Type:     PROVIDER INFORMATION   PCP:         Negra Mobley MD PCP Phone:  None   Referring Prov:   No ref.  provider found Referring Phone:  Referring Fax:  N/A      Advanced Directive:  Not Received Research:     Lab Client:   Enrollment Status:          Printed on 21 12:03 PM Page Clinical faxed to the [facility] to obtain follow up appointment for pt.   Message also left for Lail at the [facility] re: referral.

## 2021-05-28 NOTE — ED NOTES
Urine specimen obtained. Pt continues to request [facility] [place] consult. md made aware and to consult.  Pt aware of plan
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
## 2021-05-28 NOTE — ED NOTES
[place] [facility] [place] contacted by [facility]. Arrives to triage yelling at staff, throwing personal belongings down onto ground. Yelling at staff stating \"I need to see a God damn doctor right now before I kill myself or somebody\". Dr Miah Triana in to triage to assess.

## 2021-05-28 NOTE — DISCHARGE INSTRUCTIONS
Take medications as prescribed. Do the full program at the [facility] and stop abusing alcohol and using recreational drugs.

## 2021-05-28 NOTE — ED NOTES
Attempted to provide pt option to receiving IM medications. Offered option to same gluteus oswaldo or separate for 2 injections. Pt speaking loudly to this RN stating \"your the damn nurse, whats your suggestion\". Explained attempting to provide pt with options that meets her comfort. Continues to speak loudly and aggressively towards staff. After much discussion pt agreeable to receive both injections to left gluteus oswaldo.    Continues to refuse to provide urine specimen

## 2021-05-28 NOTE — ED PROVIDER NOTES
81-year-old female complaining of being angry and wanting to hurt someone. Patient is very disruptive in triage yelling at the nurses telling that she needs to have a private room and wants to see a doctor. She does participate in blood draw. Patient had a history of bipolar disorder patient's been on antidepressive medications in the past including Seroquel and Lexapro. She is not currently under the care of any psychiatrist.  Patient had been on Lexapro and Seroquel but quit taking them more than a month ago. Mental Health Problem   This is a recurrent problem. The current episode started more than 1 week ago. The problem has not changed since onset. Associated symptoms include agitation and violence. Pertinent negatives include no delusions, no hallucinations and no self-injury. Risk factors include alcohol intake. Her past medical history does not include seizures, CVA, TIA or psychotropic medication treatment. No past medical history on file. No past surgical history on file. No family history on file. Social History     Socioeconomic History    Marital status: SINGLE     Spouse name: Not on file    Number of children: Not on file    Years of education: Not on file    Highest education level: Not on file   Occupational History    Not on file   Tobacco Use    Smoking status: Not on file   Substance and Sexual Activity    Alcohol use: Not on file    Drug use: Not on file    Sexual activity: Not on file   Other Topics Concern    Not on file   Social History Narrative    Not on file     Social Determinants of Health     Financial Resource Strain:     Difficulty of Paying Living Expenses:    Food Insecurity:     Worried About Running Out of Food in the Last Year:     [address] in the Last Year:    Transportation Needs:     Lack of Transportation (Medical):      Lack of Transportation (Non-Medical):    Physical Activity:     Days of Exercise per Week:     Minutes of Exercise per Session:    Stress:     Feeling of Stress :    Social Connections:     Frequency of Communication with Friends and Family:     Frequency of Social Gatherings with Friends and Family:     Attends Mandaeism Services:     Active Member of Clubs or Organizations:     Attends Club or Organization Meetings:     Marital Status:    Intimate Partner Violence:     Fear of Current or Ex-Partner:     Emotionally Abused:     Physically Abused:     Sexually Abused: ALLERGIES: Patient has no known allergies. Review of Systems   Constitutional: Negative. Negative for activity change. HENT: Negative. Eyes: Negative. Respiratory: Negative. Cardiovascular: Negative. Gastrointestinal: Negative. Genitourinary: Negative. Musculoskeletal: Negative. Skin: Negative. Neurological: Negative. Psychiatric/Behavioral: Positive for agitation. Negative for hallucinations and self-injury. All other systems reviewed and are negative. Vitals:    05/28/21 0005 05/28/21 0844   BP: (!) 168/93 (!) 180/114   Pulse: (!) 110 (!) 115   Resp: 20 18   Temp: 97.9 °F (36.6 °C) 98.5 °F (36.9 °C)   SpO2: 97% 95%   Weight: 72.6 kg (160 lb)    Height: 5' 5\" (1.651 m)             Physical Exam  Vitals and nursing note reviewed. Constitutional:       General: She is not in acute distress. Appearance: She is well-developed. HENT:      Head: Normocephalic and atraumatic. Right Ear: External ear normal.      Left Ear: External ear normal.      Nose: Nose normal.   Eyes:      General: No scleral icterus. Right eye: No discharge. Left eye: No discharge. Conjunctiva/sclera: Conjunctivae normal.      Pupils: Pupils are equal, round, and reactive to light. Cardiovascular:      Rate and Rhythm: Regular rhythm. Pulmonary:      Effort: Pulmonary effort is normal. No respiratory distress. Breath sounds: Normal breath sounds. No stridor. No wheezing or rales. Abdominal:      General: Bowel sounds are normal. There is no distension. Palpations: Abdomen is soft. Tenderness: There is no abdominal tenderness. Musculoskeletal:         General: Normal range of motion. Cervical back: Normal range of motion. Skin:     General: Skin is warm and dry. Findings: No rash. Neurological:      Mental Status: She is alert and oriented to person, place, and time. Motor: No abnormal muscle tone. Coordination: Coordination normal.   Psychiatric:         Mood and Affect: Affect is labile, angry and inappropriate. Behavior: Behavior is agitated, aggressive and combative. Thought Content: Thought content includes homicidal ideation. Judgment: Judgment is impulsive and inappropriate. MDM  Number of Diagnoses or Management Options     Amount and/or Complexity of Data Reviewed  Clinical lab tests: ordered and reviewed  Tests in the radiology section of CPT®: ordered and reviewed  Tests in the medicine section of CPT®: ordered and reviewed  Decide to obtain previous medical records or to obtain history from someone other than the patient: yes  Review and summarize past medical records: yes  Independent visualization of images, tracings, or specimens: yes    Risk of Complications, Morbidity, and/or Mortality  Presenting problems: high  Diagnostic procedures: high  Management options: high    Patient Progress  Patient progress: stable    ED Course as of May 28 1205   Fri May 28, 2021   1 [facility] Lenny Marie practitioner with psychiatry was consulted and saw the patient. At this point she does feel as though the patient's main issue is substance abuse and alcohol related. The patient is doing better and wants to go to the [facility].   She will be discharged with a prescription for the Lexapro and Seroquel as she has been on them before and will be given referrals for mental health follow-up once cleared from the [facility]    [KH]      ED Course User Index  [KH] Lázaro Falcon,        Procedures

## 2021-05-28 NOTE — ED NOTES
Pt notified of need for urine specimen. States \"I dont have to pee\" states \"give me some water\". Pt continues to yell at staff, demanding. Uncooperative.

## 2021-05-28 NOTE — PROGRESS NOTES
CM spoke with pt at bedside, pt found laying on stretcher easily awakens and agreeable to speak with CM, attempted to confirm address but pt states she no longer stays at the address listed on face sheet but CM was instructed to leave it on there states she has recently been living with friends but not sure if she wants to return there or not, no emergency contact provided pt states she has no one, pt confirms phone number at this time. Pt confirmed she has had thoughts of hurting self but no plans at this time, states she feels very jittery at this time. [facility]OR contacted and here to see pt at bedside, states pt is very familiar to them they has assisted her with shelter and treatment options in Dec 2020 and Feb 2021. Pt is to be placed on involuntary commitment papers while in ED by Tele psych MD. Papers completed by MD.    CM staff will remain available for further assistance with dc. Care Management Interventions  PCP Verified by CM: Yes (no pcp)  Current Support Network:  Other (unknown)  Confirm Follow Up Transport: Friends  Discharge Location  Discharge Placement: Unable to determine at this time

## 2021-05-28 NOTE — ED NOTES
Resting on right side with eyes closed. Lights off. Awaiting sobriety for possible assessment by [facility].

## 2021-05-28 NOTE — ED TRIAGE NOTES
Pt arrives via [facility] from  on [address] with mask in place. When police arrived she was in bathroom and flushed toilet 4 times. Pt in triage yelling that she needs to see a doctor before she kills herself or someone else.

## 2021-05-28 NOTE — ED NOTES
Resting on right side. Eyes closed. Lights off. Respirations even and nonlabored.  Per md to reassess at 0700

## 2021-05-29 NOTE — ED NOTES
I have reviewed discharge instructions with the patient. The patient verbalized understanding. Patient left ED via Discharge Method: ambulatory to [facility] with rideshare. Opportunity for questions and clarification provided. Patient given 2 scripts. To continue your aftercare when you leave the hospital, you may receive an automated call from our care team to check in on how you are doing. This is a free service and part of our promise to provide the best care and service to meet your aftercare needs.  If you have questions, or wish to unsubscribe from this service please call [phone]. Thank you for Choosing our [employer] Emergency Department.

## 2021-06-07 NOTE — PROGRESS NOTES
CM received call from [facility] staff with follow up appointment for June 8th at 0830 am, CM attempted to reach pt via phone number listed in chart but number has been disconnected. No other contact number provided by pt on admission to ED on day of admission. Message left for Mental Health staff to see if they were able to contact pt to notify of appointment date and time, VM left for return call.

## 2024-06-14 NOTE — ED NOTES
[facility] APPOINTMENT CONFIRMATION      Date: 6/19/24    Time: 2:00 pm    Provider name: Dr. Gillian Harper     Location: Neurology Locations: [place]: [facility] [address]    Zoom link sent (if applicable): No    Will patient be in [place] for the virtual visit? No    Is patient currently in a facility? No    Alternative contact information: N/A    Appointment confirmed: Yes    \"Please ensure you bring the medication bottles, including the dates and times you take each medication.\"  New Patients: \"Please bring any outside records or images.\"    \"We do have free parking available in the main hospital parking lot. However, parking can be difficult to find so we ask that you arrive 40 minutes prior to your appointment.\"       Resting on left side with eyes closed. Lights off. Sitter at door.